# Patient Record
Sex: FEMALE | Race: WHITE
[De-identification: names, ages, dates, MRNs, and addresses within clinical notes are randomized per-mention and may not be internally consistent; named-entity substitution may affect disease eponyms.]

---

## 2017-01-24 ENCOUNTER — HOSPITAL ENCOUNTER (OUTPATIENT)
Dept: HOSPITAL 62 - OD | Age: 72
End: 2017-01-24
Payer: MEDICARE

## 2017-01-24 DIAGNOSIS — E78.2: Primary | ICD-10-CM

## 2017-01-24 DIAGNOSIS — E53.8: ICD-10-CM

## 2017-01-24 DIAGNOSIS — E78.00: ICD-10-CM

## 2017-01-24 DIAGNOSIS — Z13.220: ICD-10-CM

## 2017-01-24 DIAGNOSIS — D52.9: ICD-10-CM

## 2017-01-24 DIAGNOSIS — E78.1: ICD-10-CM

## 2017-01-24 DIAGNOSIS — R53.83: ICD-10-CM

## 2017-01-24 LAB
ALBUMIN SERPL-MCNC: 4.3 G/DL (ref 3.5–5)
ALP SERPL-CCNC: 52 U/L (ref 38–126)
ALT SERPL-CCNC: 35 U/L (ref 9–52)
ANION GAP SERPL CALC-SCNC: 9 MMOL/L (ref 5–19)
AST SERPL-CCNC: 35 U/L (ref 14–36)
BILIRUB DIRECT SERPL-MCNC: 0 MG/DL (ref 0–0.3)
BILIRUB SERPL-MCNC: 0.7 MG/DL (ref 0.2–1.3)
BUN SERPL-MCNC: 20 MG/DL (ref 7–20)
CALCIUM: 10.1 MG/DL (ref 8.4–10.2)
CHLORIDE SERPL-SCNC: 106 MMOL/L (ref 98–107)
CHOLEST SERPL-MCNC: 169.13 MG/DL (ref 0–200)
CO2 SERPL-SCNC: 30 MMOL/L (ref 22–30)
CREAT SERPL-MCNC: 1 MG/DL (ref 0.52–1.25)
DIRECT HDL: 58 MG/DL (ref 40–?)
FOLATE SERPL-MCNC: > 20 NG/ML (ref 2.76–?)
GLUCOSE SERPL-MCNC: 81 MG/DL (ref 75–110)
LDLC SERPL DIRECT ASSAY-MCNC: 85 MG/DL (ref ?–100)
POTASSIUM SERPL-SCNC: 4.3 MMOL/L (ref 3.6–5)
PROT SERPL-MCNC: 7 G/DL (ref 6.3–8.2)
SODIUM SERPL-SCNC: 144.6 MMOL/L (ref 137–145)
T3FREE SERPL-MCNC: 4.31 PG/ML (ref 2.77–5.27)
TRIGL SERPL-MCNC: 124 MG/DL (ref ?–150)
TSH SERPL-ACNC: 4.55 UIU/ML (ref 0.47–4.68)
VIT B12 SERPL-MCNC: 955 PG/ML (ref 239–931)
VLDLC SERPL CALC-MCNC: 25 MG/DL (ref 10–31)

## 2017-01-24 PROCEDURE — 82306 VITAMIN D 25 HYDROXY: CPT

## 2017-01-24 PROCEDURE — 82607 VITAMIN B-12: CPT

## 2017-01-24 PROCEDURE — 80053 COMPREHEN METABOLIC PANEL: CPT

## 2017-01-24 PROCEDURE — 84439 ASSAY OF FREE THYROXINE: CPT

## 2017-01-24 PROCEDURE — 84443 ASSAY THYROID STIM HORMONE: CPT

## 2017-01-24 PROCEDURE — 82746 ASSAY OF FOLIC ACID SERUM: CPT

## 2017-01-24 PROCEDURE — 36415 COLL VENOUS BLD VENIPUNCTURE: CPT

## 2017-01-24 PROCEDURE — 84481 FREE ASSAY (FT-3): CPT

## 2017-01-24 PROCEDURE — 80061 LIPID PANEL: CPT

## 2017-04-13 NOTE — ER DOCUMENT REPORT
ED Dizziness/Weakness





- General


Chief Complaint: Shortness Of Breath


Stated Complaint: LIGHT HEADED,SHORTNESS OF BREATH


Time seen by provider: 15:43


Mode of Arrival: Ambulatory


Information source: Patient


Notes: 


71-year-old female presents to ED for dizziness and lightheadedness.  She 

states she has had this problem for a while.  She has a past history of blood 

pressure cholesterol and vertigo.  She has been taking Dramamine for the dizzy 

feeling.  She has an appointment with her doctor next week.


TRAVEL OUTSIDE OF THE [place] IN LAST 30 DAYS: No





- HPI


Patient complains to provider of: Dizziness, Other - Short of breath congestion


Onset: Other - Chronic


Onset/Duration: Intermittent


Quality of pain: No pain


Pain Level: Denies


Context: Chronic dizziness


Associated symptoms: Dizzy, Short of breath


Exacerbated by: Change in position, Movement of head


Baseline gait: Walks w/o assistance





- Related Data


Allergies/Adverse Reactions: 


 





No Known Allergies Allergy (Verified 04/13/17 13:43)


 











Past Medical History





- General


Information source: Patient





- Social History


Smoking Status: Never Smoker


Cigarette use (# per day): No


Chew tobacco use (# tins/day): No


Smoking Education Provided: No


Frequency of alcohol use: None


Drug Abuse: None


Lives with: Family


Family History: Reviewed & Not Pertinent


Patient has suicidal ideation: No


Patient has homicidal ideation: No





- Past Medical History


Cardiac Medical History: Reports: Hx Coronary Artery Disease - HIGH CHOLESTEROL

, Hx Hypercholesterolemia, Hx Hypertension - recently taken off meds


Pulmonary Medical History: Reports: None


EENT Medical History: Reports: None


Neurological Medical History: Reports: Hx Cerebrovascular Accident - LIMITED 

VISION IN LEFT EYE


Endocrine Medical History: Reports: None


Renal/ Medical History: Reports: None


Malignancy Medical History: Reports: None


GI Medical History: Reports: None


Musculoskeltal Medical History: Reports Hx Arthritis


Skin Medical History: Reports None


Psychiatric Medical History: Reports: None


Traumatic Medical History: Reports: None


Infectious Medical History: Reports: None


Past Surgical History: Reports: Hx Appendectomy, Hx Orthopedic Surgery





- Immunizations


Hx Diphtheria, Pertussis, Tetanus Vaccination: No - unknown


Hx Pneumococcal Vaccination: 10/01/11





Review of Systems





- Review of Systems


Constitutional: No symptoms reported


EENT: Nose congestion


Cardiovascular: Dizziness


Respiratory: Short of breath


Gastrointestinal: No symptoms reported


Genitourinary: No symptoms reported


Female Genitourinary: No symptoms reported


Musculoskeletal: No symptoms reported


Skin: No symptoms reported


Hematologic/Lymphatic: No symptoms reported


Neurological/Psychological: No symptoms reported





Physical Exam





- Vital signs


Vitals: 


 











Temp Pulse Resp BP Pulse Ox


 


 98.0 F   81   22 H  134/96 H  96 


 


 04/13/17 13:21  04/13/17 13:21  04/13/17 13:21  04/13/17 13:21  04/13/17 13:21











Interpretation: Normal





- General


General appearance: Appears well, Alert





- HEENT


Head: Normocephalic, Atraumatic


Eyes: Normal


Pupils: PERRL


Ears: Normal


External canal: Normal


Tympanic membrane: Normal


Sinus: Normal


Nasal: Swelling, Clear rhinorrhea


Mouth/Lips: Normal


Mucous membranes: Normal


Pharynx: Normal


Neck: Normal





- Respiratory


Respiratory status: No respiratory distress


Chest status: Nontender


Breath sounds: Normal


Chest palpation: Normal





- Cardiovascular


Rhythm: Regular


Heart sounds: Normal auscultation


Murmur: No





- Abdominal


Inspection: Normal


Distension: No distension


Bowel sounds: Normal


Tenderness: Nontender


Organomegaly: No organomegaly





- Back


Back: Normal, Nontender





- Extremities


General upper extremity: Normal inspection, Nontender, Normal color, Normal ROM

, Normal temperature


General lower extremity: Normal inspection, Nontender, Normal color, Normal ROM

, Normal temperature, Normal weight bearing.  No: Jeff's sign





- Neurological


Neuro grossly intact: Yes


Cognition: Normal


Orientation: AAOx4


Tae Coma Scale Eye Opening: Spontaneous


[place] Coma Scale Verbal: Oriented


[place] Coma Scale Motor: Obeys Commands


Tae Coma Scale Total: 15


Speech: Normal


Motor strength normal: LUE, RUE, LLE, RLE


Sensory: Normal





- Psychological


Associated symptoms: Normal affect, Normal mood





- Skin


Skin Temperature: Warm


Skin Moisture: Dry


Skin Color: Normal





Course





- Re-evaluation


Re-evalutation: 


04/13/17 15:51


Discussed labs and chest x-ray with Dr. Nicole.  He recommended patient be 

given fluids to drink and have orthostatic blood pressures done which were 

ordered and patient has drank 240 mL of apple juice and is awaiting her 

orthostatic blood pressures.  Then I will discharge patient home with a copy of 

her chest x-ray and labs to follow-up with her primary doctor.





04/13/17 16:09


Patient is orthostatic dropping from 133/78 to 115/76 will give a liter bolus 

before discharge.





- Vital Signs


Vital signs: 


 











Temp Pulse Resp BP Pulse Ox


 


 98.0 F   66   20   133/78 H  96 


 


 04/13/17 13:21  04/13/17 16:06  04/13/17 13:55  04/13/17 16:06  04/13/17 13:21














- Laboratory


Result Diagrams: 


 04/13/17 13:43





 04/13/17 13:43


Laboratory results interpreted by me: 


 











  04/13/17 04/13/17





  13:43 13:43


 


Hgb  16.1 H 


 


Hct  47.3 H 


 


BUN   31 H


 


Calcium   11.1 H


 


Creatine Kinase   247 H














- Diagnostic Test


Radiology reviewed: Image reviewed, Reports reviewed





Discharge





- Discharge


Clinical Impression: 


 Dizziness





URI (upper respiratory infection)


Qualifiers:


 URI type: unspecified URI Qualified Code(s): J06.9 - Acute upper respiratory 

infection, unspecified





Additional Instructions: 


DIZZINESS:


Under normal circumstances, your sense of balance is controlled by a number of 

signals that your brain receives from several locations:


   Eyes. No matter what your position, visual signals help you determine where 

your body is in space and how it's moving.





   Sensory nerves. These are in your skin, muscles and joints. Sensory nerves 

send messages to your brain about body movements and positions.





   Inner ear. The organ of balance in your inner ear is the vestibular 

labyrinth. It includes loop-shaped structures (semicircular canals) that 

contain fluid and fine, hair-like sensors that monitor the rotation of your 

head. Near the semicircular canals are the utricle and saccule, which contain 

tiny particles called otoconia (o-toe-KOE-nee-uh). These particles are attached 

to sensors that help detect gravity and back-and-forth motion.





Good balance depends on at least two of these three sensory systems working 

well. For instance, closing your eyes while washing your hair in the shower 

doesn't mean you'll lose your balance. Signals from your inner ear and sensory 

nerves help keep you upright.


However, if your central nervous system can't process signals from all of these 

locations, if the messages are contradictory, or if the sensory systems aren't 

functioning properly, you may experience loss of balance.


Dizziness may have a number of potential causes. These may include:


Vertigo


Vertigo - the false sense of motion or spinning - is the most common symptom of 

dizziness. Sitting up or moving around may make it worse. Sometimes vertigo is 

severe enough to cause nausea and vomiting.


Vertigo usually results from a problem with the nerves and the structures of 

the balance mechanism in your inner ear (vestibular system), which sense 

movement and changes in your head position. Abnormal rhythmic eye movements (

nystagmus) almost always accompany vertigo. Causes of vertigo may include:


   Benign paroxysmal positional vertigo (BPPV). BPPV involves intense, brief 

episodes of vertigo associated with a change in the position of your head, 

often when you turn over in bed or sit up in the morning. It occurs when normal 

calcium carbonate crystals (otoconia) break loose and fall into the wrong part 

of the canals in your inner ear. When these particles shift, they stimulate 

sensors in your ear, producing an episode of vertigo. Doctors don't know what 

causes BPPV, but it may be a natural result of aging. Trauma to your head also 

may lead to BPPV.





   Inflammation in the inner ear. Signs and symptoms of inflammation of the 

inner ear (acute vestibular neuronitis or labyrinthitis) include sudden, 

intense vertigo that may persist for several days, with nausea and vomiting. It 

can be incapacitating, requiring bed rest to minimize the signs and symptoms. 

Fortunately, vestibular neuronitis generally subsides and clears up on its own. 

Recovery time may be shorter with vestibular rehabilitation exercises. Although 

the cause of this condition is unknown, it may be a viral infection.





   Meniere's disease. This disease involves the excessive buildup of fluid in 

your inner ear. It may affect adults at any age and is characterized by sudden 

episodes of vertigo lasting 30 minutes to an hour or longer. Other signs and 

symptoms include the feeling of fullness in your ear, buzzing or ringing in 

your ear (tinnitus), and fluctuating hearing loss. The cause of Meniere's 

disease is unknown.





   Vestibular migraine. People who experience a vestibular migraine are very 

sensitive to motion. Dizziness and vertigo caused by a vestibular migraine may 

be triggered by turning your head quickly, being in a crowded or confusing place

, driving or riding in a vehicle, or even watching movement on TV. A vestibular 

migraine may cause feelings of imbalance or unsteadiness, hearing loss, "muffled

" hearing, or ringing in your ears (tinnitus). For most people with a 

vestibular migraine, vertigo doesn't necessarily happen at the same time as the 

headache. Instead, typical migraine triggers may lead to vertigo without an 

actual migraine. Attacks of migrainous vertigo can last from a few minutes to 

several days.





   Acoustic neuroma. An acoustic neuroma (schwannoma) is a noncancerous (benign

) growth on the acoustic nerve, which connects the inner ear to your brain. 

Signs and symptoms of an acoustic neuroma may include dizziness, loss of balance

, hearing loss and tinnitus.





   Rapid changes in motion. Riding on roller coasters or in boats, cars or even 

airplanes may on occasion make you dizzy.





   Other causes. Rarely, vertigo can be a symptom of a more serious 

neurological problem such as a stroke, brain hemorrhage or multiple sclerosis.





Feeling of faintness (presyncope)


"Presyncope" is the medical term for feeling faint and lightheaded without 

losing consciousness. Sometimes nausea, pale skin and a sense of dizziness 

accompany a feeling of faintness. Causes of presyncope include:


   Drop in blood pressure (orthostatic hypotension). A dramatic drop in your 

systolic blood pressure - the higher number in your blood pressure reading - 

may result in lightheadedness or a feeling of faintness. It can occur after 

sitting up or standing too quickly.





   Inadequate output of blood from the heart. Conditions such as partially 

blocked arteries (atherosclerosis), disease of the heart muscle (cardiomyopathy)

, abnormal heart rhythm (arrhythmia) or a decrease in blood volume may cause 

inadequate blood flow from your heart.





Loss of balance (disequilibrium)


Disequilibrium is the loss of balance or the feeling of unsteadiness when you 

walk. Causes may include:


   Inner ear (vestibular) problems. Abnormalities with your inner ear can cause 

you to feel like you are floating, have a heavy head or are unsteady in the 

dark.





   Sensory disorders. Failing vision and nerve damage in your legs (peripheral 

neuropathy) are common in older adultsand may result in difficulty maintaining 

your balance.





   Joint and muscle problems. Muscle weakness and osteoarthritis - the type of 

arthritis that involves wear and tear of your joints - can contribute to loss 

of balance when it involves your weight-bearing joints.





   Medications. Loss of balance can be a side effect of certain medications, 

such as anti-seizure drugs, sedatives and tranquilizers.





Lightheadedness and other kinds of dizziness


Feeling lightheaded is the feeling of being "spaced out" or having the 

sensation of spinning inside your head. It can also give you the sensation that 

if your lightheadedness worsens, you might lose consciousness. Causes may 

include:


   Inner ear disorders. These abnormalities of your inner ear can lead to 

illusions of motion and make you feel like you're floating.





   Anxiety disorders. Certain anxiety disorders, such as panic attacks and a 

fear of leaving home or being in large, open spaces (agoraphobia), may cause 

lightheadedness.





   Hyperventilation. Abnormally rapid breathing that often accompanies anxiety 

disorders may make you feel lightheaded.





UPPER RESPIRATORY ILLNESS:





     You have a viral infection of the respiratory passages -- a "cold."  This 

common infection causes nasal congestion, drainage, and often sore throat and 

cough.  It is highly contagious.  The disease usually lasts about 10 to 14 days.


     There is no "cure" for the viral infection -- it must run its course.  If 

there is a complication, such as bacterial infection in the nose, sinuses, 

middle ear, or bronchial tubes, antibiotics may be required.  The antibiotics 

won't affect the virus.


     Drink plenty of fluids.  A humidifier may help.  An expectorant medication 

or decongestant may make you more comfortable.  Use acetaminophen or ibuprofen 

for fever or aches.


     See the doctor if fever persists over two days, if there is any 

significant worsening of your symptoms, or if you simply fail to improve as 

expected.








FOLLOW-UP CARE:


If you have been referred to a physician for follow-up care, call the physician

s office for an appointment as you were instructed or within the next two days.

  If you experience worsening or a significant change in your symptoms, notify 

the physician immediately or return to the Emergency Department at any time for 

re-evaluation.





Prescriptions: 


Meclizine HCl 25 mg PO BIDP PRN #14 tablet


 PRN Reason: 


Forms:  Elevated Blood Pressure


Referrals: 


ERICKA DUNN MD [Primary Care Provider] - Follow up as needed

## 2017-04-13 NOTE — EKG REPORT
SEVERITY:- ABNORMAL ECG -

SINUS RHYTHM

LEFT ANTERIOR FASCICULAR BLOCK

LEFT VENTRICULAR HYPERTROPHY

:

Confirmed by: Garry Griffin MD 13-Apr-2017 19:46:49

## 2017-04-13 NOTE — ER DOCUMENT REPORT
ED Medical Screen (RME)





- General


Chief Complaint: Shortness Of Breath


Stated Complaint: LIGHT HEADED,SHORTNESS OF BREATH


Notes: 


Patient is a 71-year-old female, past medical history hypertension, 

hyperlipidemia, presents with worsening shortness of breath and dyspnea on 

exertion for the past few days.  She is now only able to walk 30 feet without 

becoming very short of breath and having to rest.  She saw her primary care 

physician this week and was told to begin Zyrtec and Flonase for the symptoms.  

Her last echo was several years ago.  She denies chest pain, shortness of 

breath at rest, leg swelling, abdominal pain, fevers, cough, nausea, vomiting 

or urinary symptoms.





I have greeted and performed a rapid initial assessment of this patient.  A 

comprehensive ED assessment and evaluation of the patient, analysis of test 

results and completion of the medical decision making process will be conducted 

by additional ED providers.


TRAVEL OUTSIDE OF THE [place] IN LAST 30 DAYS: No





- Related Data


Allergies/Adverse Reactions: 


 





No Known Allergies Allergy (Verified 01/19/14 10:10)


 











Past Medical History





- General


Information source: Patient





- Past Medical History


Cardiac Medical History: Reports: Hx Coronary Artery Disease - HIGH CHOLESTEROL

, Hx Hypercholesterolemia, Hx Hypertension - recently taken off meds


   Denies: Hx Heart Attack


Pulmonary Medical History: 


   Denies: Hx Asthma, Hx Bronchitis, Hx COPD, Hx Pneumonia, Hx Tuberculosis


Neurological Medical History: Reports: Hx Cerebrovascular Accident - LIMITED 

VISION IN LEFT EYE.  Denies: Hx Seizures


Renal/ Medical History: Denies: Hx Peritoneal Dialysis


Musculoskeltal Medical History: Reports Hx Arthritis


Past Surgical History: Reports: Hx Appendectomy





- Immunizations


Hx Diphtheria, Pertussis, Tetanus Vaccination: No - unknown





Physical Exam





- Vital signs


Vitals: 


 











Temp Pulse Resp BP Pulse Ox


 


 98.0 F   81   22 H  134/96 H  96 


 


 04/13/17 13:21  04/13/17 13:21  04/13/17 13:21  04/13/17 13:21  04/13/17 13:21














Course





- Vital Signs


Vital signs: 


 











Temp Pulse Resp BP Pulse Ox


 


 98.0 F   81   22 H  134/96 H  96 


 


 04/13/17 13:21  04/13/17 13:21  04/13/17 13:21  04/13/17 13:21  04/13/17 13:21

## 2017-09-14 NOTE — RADIOLOGY REPORT (SQ)
EXAM DESCRIPTION:  CT CHEST WITHOUT



COMPLETED DATE/TIME:  9/14/2017 2:04 pm



REASON FOR STUDY:  SHORTNESS OF BREATH R06.02  SHORTNESS OF BREATH R06.2  WHEEZING



COMPARISON:  11/10/2011



TECHNIQUE:  CT scan performed of the chest without intravenous contrast.  Images reviewed with lung, 
soft tissue and bone windows.  Reconstructed coronal and sagittal MPR images reviewed.  All images st
ored on PACS.

All CT scanners at this facility use dose modulation, iterative reconstruction, and/or weight based d
osing when appropriate to reduce radiation dose to as low as reasonably achievable (ALARA).

[facility]: Dose Right  [facility]: CareDose    [facility]: Dose Right    [facility]: Teradose 4D    [facility]: Smart Technologies



RADIATION DOSE:  Up-to-date CT equipment and radiation dose reduction techniques were employed. CTDIv
ol: 9.9 mGy. DLP: 390 mGy-cm. mGy.



LIMITATIONS:  No technical limitations.



FINDINGS:  LUNGS AND PLEURA: No masses, infiltrates, pneumothorax.  No pleural effusions, calcificati
ons.

HILAR AND MEDIASTINAL STRUCTURES: No identified masses or abnormal nodes.  No obvious aneurysm.

HEART AND VASCULAR STRUCTURES: No pericardial effusion.  The aortic root and ascending aorta are dila
suyapa.  Aortic root measures up to 4.5 cm in size this is increased from 4.2 cm in 2011.

UPPER ABDOMEN: No significant findings.  Limited exam.

THYROID AND OTHER SOFT TISSUES: No masses.  No adenopathy.

BONES: There are degenerative changes in the thoracic spine.

HARDWARE: None in the chest.

OTHER: No other significant findings.



IMPRESSION:  Dilatation of the ascending aorta.  Largest diameter is 4.5 cm.  This is increased from 
4.2 cm in 2011.



TECHNICAL DOCUMENTATION:  JOB ID:  [id number]

Quality ID # [id number]: Final reports with documentation of one or more dose reduction techniques (e.g., Au
tomated exposure control, adjustment of the mA and/or kV according to patient size, use of iterative 
reconstruction technique)

 2011 [employer]- All Rights Reserved

## 2017-09-15 NOTE — XCELERA REPORT
[address] [address]

                             Tel: [phone]

                             Fax: [phone]



                    Transthoracic Echocardiogram Report

____________________________________________________________________________



Name: SALOME SALAZAR

MRN: [id number]                Age: 72 yrs

Gender: Female                 : 1945

Patient Status: Outpatient     Patient Location: 

Account #: [id number]

Study Date: 2017 12:43 PM

Accession #: [id number]

____________________________________________________________________________



Height: 63 in        Weight: 195 lb        BSA: 1.9 m2



____________________________________________________________________________

Procedure: A complete two-dimensional transthoracic echocardiogram was

performed (2D, M-mode, spectral and color flow Doppler). The study was

technically adequate with some images being suboptimal in quality.

Reason For Study: SOB





Ordering Physician: ERICKA DUNN

Performed By: Brandi Amor

____________________________________________________________________________





Interpretation Summary

The left ventricular ejection fraction is within normal limits.

Doppler measurements suggest pseudonormalized left ventricular relaxation,

which is associated with grade II/IV or mild to moderate diastolic

dysfunction

There is mild concentric left ventricular hypertrophy.

The left ventricle is grossly normal size.

Wall motion cannot be accurately commented on, but no definite regional

wall motion abnormalities noted.

The right ventricular systolic function is normal.

The left atrium is mildly dilated.

The right atrium is normal in size

There is a mild amount of mitral regurgitation

There is no mitral valve stenosis.

There is a trace amount of aortic regurgitation

There is no aortic valve stenosis

Tricuspid regurgitation jet envelope not well defined to measure RV

systolic pressure accurately.

There is a trace amount of tricuspid regurgitation

The aortic root is not well visualized but is probably normal size.

The inferior vena cava was not well visualized

There is no pericardial effusion.



____________________________________________________________________________



MMode/2D Measurements & Calculations

RVDd: 2.8 cm  LVIDd: 4.0 cm FS: 33.6 %            Ao root diam: 3.6 cm

IVSd: 1.1 cm  LVIDs: 2.7 cm EDV(Teich): 70.0 ml

              LVPWd: 1.1 cm ESV(Teich): 26.0 ml   Ao root area: 10.1 cm2

                            EF(Teich): 62.9 %



Doppler Measurements & Calculations

MV E max marcos:      MV dec slope:       Ao V2 max:        LV V1 max P.3 cm/sec                            164.0 cm/sec      6.2 mmHg

MV A max marcos:      145.1 cm/sec2       Ao max PG:        LV V1 max:

92.7 cm/sec        MV dec time:        10.8 mmHg         124.4 cm/sec

MV E/A: 0.53       0.34 sec



        _____________________________________________________________

PA V2 max:         TR max marcos:

56.5 cm/sec        210.8 cm/sec

PA max PG:         TR max P.8 mmHg

1.3 mmHg



____________________________________________________________________________

Left Ventricle

The left ventricle is grossly normal size. There is mild concentric left

ventricular hypertrophy. The left ventricular ejection fraction is within

normal limits. Doppler measurements suggest pseudonormalized left

ventricular relaxation, which is associated with grade II/IV or mild to

moderate diastolic dysfunction. Wall motion cannot be accurately commented

on, but no definite regional wall motion abnormalities noted.



Right Ventricle

The right ventricle is grossly normal size. There is normal right

ventricular wall thickness. The right ventricular systolic function is

normal.



Atria

The right atrium is normal in size. The left atrium is mildly dilated.

Interarterial septum not well visualized and not well dopplered. Cannot

comment on ASD/PFO presence.





Mitral Valve

There is mild mitral annular calcification. There is no mitral valve

stenosis. There is a mild amount of mitral regurgitation.



Aortic Valve

The aortic valve is mildly calcified. There is no aortic valve stenosis.

There is a trace amount of aortic regurgitation.



Tricuspid Valve

The tricuspid valve is not well visualized, but is grossly normal. There is

no tricuspid stenosis. There is a trace amount of tricuspid regurgitation.

Tricuspid regurgitation jet envelope not well defined to measure RV

systolic pressure accurately.



Pulmonic Valve

The pulmonic valve is not well visualized.



Great Vessels

The aortic root is not well visualized but is probably normal size. The

inferior vena cava was not well visualized.





Effusions

There is no pericardial effusion.



____________________________________________________________________________



Electronically signed by:      Jimi Aragon      on 2017 08:51 PM



CC: ERICKA DUNN Shyamal

## 2019-04-04 NOTE — OPERATIVE REPORT
Operative Report


DATE OF SURGERY: 04/04/19


PREOPERATIVE DIAGNOSIS: Screening for colon carcinoma


POSTOPERATIVE DIAGNOSIS: Hyperplastic polyps of the rectosigmoid junction; 

internal hemorrhoids


OPERATION: 1.  Total colonoscopy to cecum with photodocumentation.  2.  Multiple

polypectomies of the rectosigmoid junction


SURGEON: ALCON STEWARD


ANESTHESIA: LMAC


TISSUE REMOVED OR ALTERED: Polyps removed


COMPLICATIONS: 





None


ESTIMATED BLOOD LOSS: Scant


INTRAOPERATIVE FINDINGS: See below


PROCEDURE: 





Obtaining informed consent the patient was taken from the preoperative holding 

area to the main endoscopy suite where monitoring devices were attached to  the 

patient.  Plan and surgical timeout were conducted


 


The patient was placed in the left lateral decubitus  position with knees to 

chest.  A perianal examination was performed; small internal hemorrhoids noted, 

otherwise no palpable anorectal pathology.  Sphincter tone was felt to be 

normal.


 


The flexible adult colonoscope was advanced through the anal rectal canal, all 

the way to the cecum.  Visualization  of the cecum was achieved and the 

ileocecal valve, the appendiceal orifice and transillumination of the anterior 

abdominal wall seen.  This was an excellent study on the well-prepped bowel.  

The colonoscope was withdrawn slowly and methodically checked and the mucosa 

carefully.  There was no evidence of tumor, stricture, bleeding; in the 

rectosigmoid area at approximately 30cm sessile, benign-appearing hyperplastic 

polyps.  Multiple polyps were removed, approximately 4 with the cold forceps 

device, minimal bleeding.  Photos taken.  All specimens submitted in same 

container.   There was no evidence of diverticuloses.  The scope was slowly 

withdrawn through the anal rectal canal.  Complete visualization of the rectum 

was achieved with photodocumentation.


 


The scope was withdrawn to the patient's anus.  The patient tolerated the 

procedure well and was taken to the recovery area in stable condition.





Per surveillance guidelines, patient be appropriate candidate for follow-up 

colonoscopy in 5years or sooner if symptoms develop.

## 2019-04-04 NOTE — DISCHARGE SUMMARY
Discharge Summary (SDC)





- Discharge


Final Diagnosis: 





Hyperplastic polyps of the rectosigmoid area


Date of Surgery: 04/04/19


Discharge Date: 04/04/19


Condition: Good


Treatment or Instructions: 


               





                  [place] [id number]


                  Phone: [phone]    Fax:  [phone]








            


            POST ENDOSCOPY DISCHARGE INSTRUCTIONS








1.  Diet:  Start clear liquids that a regular diet as tolerated.





2.  Resume all preoperative medications.  All oral anticoagulants and aspirins 

can be resumed 24 hours after procedure.





3.  If a polypectomy was performed some bleeding per rectum may occur.  This 

should stop within 3 days.  If not, please contact the office.





4.  If you had a colonoscopy you may experience some bloating and delayed return

of normal bowel function for several days, your regular bowel movement pattern 

should resume within a week.





5.  Please contact [facility] at [phone] to make an appoin

tment with Dr. Gan for 1 to 3 weeks following procedure.





6.  If you have any questions or concerns regarding your care,treatment plan or 

follow up, please contact our office.





7.  Per clinical guidelines we recommend you undergo a repeat colonoscopy in 5 

years.


Referrals: 


ERICKA DUNN MD [Primary Care Provider] - 


Discharge Diet: As Tolerated


Discharge Activity: Activity As Tolerated


Home Care Assistance: None Needed


Report the Following to Your Physician Immediately: Shortness of Breath, Signs 

of Hyperglycemia, Fever over 101 Degrees

## 2019-04-04 NOTE — EKG REPORT
SEVERITY:- BORDERLINE ECG -

SINUS RHYTHM

LEFT AXIS DEVIATION

LEFT VENTRICULAR HYPERTROPHY

:

Confirmed by: Garry Griffin MD 04-Apr-2019 12:19:17

## 2019-06-06 NOTE — OPERATIVE REPORT
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





PROCEDURE: 1. Radiofrequency Ablation of right L5 dorsal Ramus  


                          2. Sacroiliac Joint Ablation - Lateral Branches of 

right S1, S2, S3


DATE OF PROCEDURE: June 6, 2019


ANESTHESIA: Local


COMPLICATIONS: None


CONSENT: A full description of the procedure was provided including benefits as 

well as possible complications. All questions were answered and informed consent

was given and signed. ASA guidelines for fasting were verified prior to 

sedation. 


PROCEDURE IN DETAIL


The patient was brought into the fluoroscopy suite and carefully assisted into 

the prone position on the fluoroscopy table and allowed to adjust to a position 

of comfort. A grounding pad was placed on the right thigh. The low back and 

buttocks were widely prepped with a chloraprep solution, allowed to air dry and 

draped in standard sterile surgical fashion. Local anesthesia was provided by 12

mL of 1 % lidocaine delivered with a 25 g needle.


 


PROCEDURE #1: Radiofrequency Ablation of Dorsal Ramus of right L5. 


A 17g 100mm radiofrequency introducer needle was placed to the planned anatomic 

target, guided with intermittent fluoroscopy with a perpendicular approach, to 

terminally place at the right sacral ala. The stylets were removed and the 

radiofrequency probes with a 4mm active tip were then inserted. Needle tip 

position of the probes were verified in the AP, oblique, and lateral views. At 

each site, the medial branch nerve was stimulated at 2Hz to a maximum of 1-2vo

lts determined to finalize safe needle and electrode placement. The patient was 

awake and responsive during this portion of the procedure. Each target was 

anesthetized with 2mL of 2 % Sensorcaine anesthesia for lesioning and then each 

target was lesioned at 80 degrees Celsius for 2 minutes and 30 seconds. Tissue 

impedences were noted to be between 250 and 500 Ohms.


PROCEDURE #2: Radiofrequency Ablation of right S1, S2, S3 Lateral Branches


Using the AP fluoroscopic view for visualization of the lateral PSFA as defined 

by the pre-placed 27-gauge Quincke needles, appropriate skin starting positions 

were defined. Using the PSFA as a "clock-face", the positions were:


S1; right = 1 and 5 oclock


S2; right = 1 and 5 oclock


S3; right = 3 oclock


Using fluoroscopic guidance, a 17g introducer needle was inserted sequentially 

onto the target positions described above until the introducer tip touched the 

bony surface of the sacrum. The stylet was withdrawn from the introducer and the

radiofrequency probe with a 4 mm active tip was fully inserted into the 

introducer. A lateral view was obtained for standard reference. At each of the 

targets, needle placement was verified with the use of multi-planar fluoroscopy.

The needle tip position was approximately 7 - 10mm lateral to the PSFA as 

determined by using an Epsilon ruler.


At each site, the lateral branch nerve was stimulated at 2 Hz to a maximum of 1-

2 volts determined to finalize safe needle and electrode placement. The patient 

was awake and responsive during this portion of the procedure. Each target was 

anesthetized with 2 mL of 2 % Sensorcaine anesthesia for lesioning and then each

target was lesioned at 80 degrees Celsius for 2 minutes and 30 seconds. Tissue 

impedences were noted to be between 250- 500 Ohms. At the conclusion of the 

lesioning the needles were removed and bandages placed over the needle placement

sites and the patient returned to the supine position on a stretcher and 

transported to the recovery room without hemodynamic, neurologic, or allergic 

reactions. Fluoroscopic images were printed for hard copy recording and 

digitally archived.


FLUOROSCOPIC INTERPRETATION: Appropriate epidurogram obtained. Appropriate 

lesioning of the 10 targets noted. 


POST PROCEDURE EVALUATION: The patient was comfortable in the recovery room. The

patient is aware that pain may worsen before remitting and 4  6 weeks may be req

uired prior to the onset of pain relief. 


IMPRESSION: 


1. Technically successful sacral lateral branch, lumbar dorsal ramus for 

denervation from L5-S3 on the right without complication.


2. RTC in 2 weeks.


3. Estimated Blood Loss: None


4. Fluoroscopy time: 30 seconds